# Patient Record
Sex: FEMALE | Race: WHITE | NOT HISPANIC OR LATINO | Employment: UNEMPLOYED | URBAN - NONMETROPOLITAN AREA
[De-identification: names, ages, dates, MRNs, and addresses within clinical notes are randomized per-mention and may not be internally consistent; named-entity substitution may affect disease eponyms.]

---

## 2018-07-03 ENCOUNTER — OFFICE VISIT (OUTPATIENT)
Dept: OBSTETRICS AND GYNECOLOGY | Facility: CLINIC | Age: 28
End: 2018-07-03

## 2018-07-03 VITALS
WEIGHT: 137 LBS | BODY MASS INDEX: 22.02 KG/M2 | HEIGHT: 66 IN | DIASTOLIC BLOOD PRESSURE: 80 MMHG | SYSTOLIC BLOOD PRESSURE: 110 MMHG

## 2018-07-03 DIAGNOSIS — Z30.49 ENCOUNTER FOR SURVEILLANCE OF OTHER CONTRACEPTIVE: Primary | ICD-10-CM

## 2018-07-03 PROCEDURE — 57170 FITTING OF DIAPHRAGM/CAP: CPT | Performed by: NURSE PRACTITIONER

## 2018-07-03 PROCEDURE — 99213 OFFICE O/P EST LOW 20 MIN: CPT | Performed by: NURSE PRACTITIONER

## 2018-07-03 NOTE — PROGRESS NOTES
"      Concepción Ceja is a 28 y.o. No obstetric history on file.     Chief Complaint   Patient presents with   • Contraception     Pt here to be fitted for a diaphram.           HPI - Concepción is getting  and wants to  Use a diaphragm.  She has researched all methods and this is what she wishes to use. She is sized with #65 diaphragm.  She inserts and removes well.  Pap is aken care of through her PCP, she has regular menses.    Concepción is a   at [employer]      The following portions of the patient's history were reviewed and updated as appropriate:vital signs, allergies, current medications, past family history, past medical history, past social history, past surgical history and problem list.    No current outpatient prescriptions on file.    Review of Systems   Constitutional: Negative for activity change, appetite change and fatigue.   HENT: Negative for congestion, dental problem, facial swelling and nosebleeds.    Respiratory: Negative for apnea and chest tightness.    Cardiovascular: Negative for chest pain and leg swelling.   Gastrointestinal: Negative for abdominal distention, anal bleeding and constipation.   Genitourinary: Negative for difficulty urinating, dysuria, menstrual problem and vaginal bleeding.   Musculoskeletal: Negative for arthralgias and joint swelling.   Psychiatric/Behavioral: Negative for agitation and behavioral problems.     Breast ROS: negative    Objective      /80 (BP Location: Right arm, Patient Position: Sitting, Cuff Size: Adult)   Ht 167.6 cm (66\")   Wt 62.1 kg (137 lb)   LMP 06/05/2018 (Approximate)   Breastfeeding? No   BMI 22.11 kg/m²       Physical Exam   Constitutional: She appears well-developed and well-nourished.   HENT:   Head: Normocephalic and atraumatic.   Pulmonary/Chest: Effort normal.   Abdominal: Soft. She exhibits no distension. There is no tenderness.   Genitourinary: Rectal exam shows no mass. Uterus is not deviated, not enlarged, " not fixed and not tender. Cervix exhibits no motion tenderness, no discharge and no friability. Right adnexum displays no mass, no tenderness and no fullness. Left adnexum displays no mass, no tenderness and no fullness. No erythema or bleeding in the vagina. No foreign body in the vagina. No signs of injury around the vagina. No vaginal discharge found.   Genitourinary Comments: Uterus is small, mobile and retroverted    #65 diaphragm is good fit   Neurological: She is alert.   Skin: Skin is warm and dry.   Psychiatric: She has a normal mood and affect. Her behavior is normal.   Nursing note and vitals reviewed.       Assessment/Plan       Concepción was seen today for contraception.    Diagnoses and all orders for this visit:    Encounter for surveillance of other contraceptive    Sized with #65 All Flex Diaphragm  Patient understands to use spermicide with the diaphragm and leave in 6-8 hours.  She has researched all methods of birthcontrol thoroughly.  I have couunseled her has well re: effective rates.    I will be checking with  Pharmacy re: getting one ordered for her.    Patient is counseled re: BSE, mammogram, calcium and Vit. D3      Vanessa Ayon, APRN  7/3/2018

## 2018-07-30 ENCOUNTER — TELEPHONE (OUTPATIENT)
Dept: OBSTETRICS AND GYNECOLOGY | Facility: CLINIC | Age: 28
End: 2018-07-30

## 2018-07-30 ENCOUNTER — PRIOR AUTHORIZATION (OUTPATIENT)
Dept: OBSTETRICS AND GYNECOLOGY | Facility: CLINIC | Age: 28
End: 2018-07-30

## 2018-07-30 NOTE — TELEPHONE ENCOUNTER
Pt said she was told to call back about her Diaphragm that was to be ordered for her. Please advise

## 2018-07-30 NOTE — TELEPHONE ENCOUNTER
Spoke with [facility] Tammi hines on 07/30/2018 about [facility] code:  (Diaphragm for Contraception).     She confirmed that this device is a buy and bill through the patient's medical ins.     Ref# [id number]

## 2018-08-20 ENCOUNTER — OFFICE VISIT (OUTPATIENT)
Dept: OBSTETRICS AND GYNECOLOGY | Facility: CLINIC | Age: 28
End: 2018-08-20

## 2018-08-20 VITALS
SYSTOLIC BLOOD PRESSURE: 110 MMHG | DIASTOLIC BLOOD PRESSURE: 78 MMHG | WEIGHT: 139 LBS | HEIGHT: 66 IN | BODY MASS INDEX: 22.34 KG/M2

## 2018-08-20 DIAGNOSIS — Z30.8 ENCOUNTER FOR DIAPHRAGM FITTING: Primary | ICD-10-CM

## 2018-08-20 PROCEDURE — 57170 FITTING OF DIAPHRAGM/CAP: CPT | Performed by: NURSE PRACTITIONER

## 2018-08-20 PROCEDURE — 99212 OFFICE O/P EST SF 10 MIN: CPT | Performed by: NURSE PRACTITIONER

## 2018-08-20 PROCEDURE — A4266 DIAPHRAGM: HCPCS | Performed by: NURSE PRACTITIONER

## 2018-08-20 NOTE — PROGRESS NOTES
"      Concepción Ceja is a 28 y.o. No obstetric history on file.     Chief Complaint   Patient presents with   • Contraception     Here for her diaphragm. OFFICE OWNS. Omniflex diaphragm, Lot # [id number], Exp 5-, size 65.            HPI - LMP August  She is getting  this week end.  She is here for her #65 diaphragm.        The following portions of the patient's history were reviewed and updated as appropriate:vital signs, allergies, current medications, past family history, past medical history, past social history, past surgical history and problem list.    No current outpatient prescriptions on file.    Review of Systems   Constitutional: Negative for activity change, appetite change and diaphoresis.   HENT: Negative for congestion, dental problem and facial swelling.    Eyes: Negative for blurred vision and itching.   Respiratory: Negative for apnea, cough and chest tightness.    Cardiovascular: Negative for chest pain and leg swelling.   Gastrointestinal: Negative for abdominal distention and abdominal pain.   Endocrine: Negative for breast discharge.   Genitourinary: Negative for dysuria, menstrual problem and breast pain.   Musculoskeletal: Negative for arthralgias, back pain and gait problem.   Psychiatric/Behavioral: Negative for agitation, decreased concentration and hallucinations.     Breast ROS: negative    Objective      /78 (BP Location: Right arm, Patient Position: Sitting, Cuff Size: Adult)   Ht 167.6 cm (66\")   Wt 63 kg (139 lb)   LMP 08/10/2018   Breastfeeding? No   BMI 22.44 kg/m²       Physical Exam   Constitutional: She appears well-developed and well-nourished. No distress.   HENT:   Head: Normocephalic and atraumatic.   Pulmonary/Chest: Effort normal.   Abdominal: Soft. There is no tenderness.   Genitourinary: Vagina normal and cervix normal. Pelvic exam was performed with patient supine. There is no tenderness or lesion on the right labia. There is no tenderness or " lesion on the left labia. Uterus is not enlarged or tender. Cervix does not exhibit motion tenderness, discharge or friability. Right adnexum displays no tenderness and no fullness. Left adnexum displays no tenderness and no fullness. No tenderness or bleeding in the vagina. No signs of injury around the vagina. No vaginal discharge found.   Psychiatric: She has a normal mood and affect. Her behavior is normal.   Nursing note and vitals reviewed.       Assessment/Plan     Concepción was seen today for contraception.    Concepción was seen today for contraception.    Diagnoses and all orders for this visit:    Encounter for diaphragm fitting  #65 DIAPHRAGM IS INSERTED AND REMOVED BY THE PATIENT.  SHE VOISED UNDERSTANDING OF USING THE DIAPHRAGM AND USING SPERMACIDE            Vanessa Ayon, LOTUS  8/20/2018